# Patient Record
Sex: MALE | Race: OTHER
[De-identification: names, ages, dates, MRNs, and addresses within clinical notes are randomized per-mention and may not be internally consistent; named-entity substitution may affect disease eponyms.]

---

## 2020-08-26 ENCOUNTER — HOSPITAL ENCOUNTER (INPATIENT)
Dept: HOSPITAL 54 - ER | Age: 60
LOS: 1 days | Discharge: HOME | DRG: 313 | End: 2020-08-27
Attending: INTERNAL MEDICINE | Admitting: INTERNAL MEDICINE
Payer: MEDICAID

## 2020-08-26 VITALS — DIASTOLIC BLOOD PRESSURE: 72 MMHG | SYSTOLIC BLOOD PRESSURE: 118 MMHG

## 2020-08-26 VITALS — BODY MASS INDEX: 27.36 KG/M2 | HEIGHT: 63 IN | WEIGHT: 154.44 LBS

## 2020-08-26 DIAGNOSIS — M77.32: ICD-10-CM
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
## 2020-08-26 DIAGNOSIS — D72.829: ICD-10-CM

## 2020-08-26 DIAGNOSIS — Z87.442: ICD-10-CM

## 2020-08-26 DIAGNOSIS — S93.05XA: ICD-10-CM
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
## 2020-08-26 DIAGNOSIS — S82.832A: ICD-10-CM

## 2020-08-26 DIAGNOSIS — S82.842A: Primary | ICD-10-CM

## 2020-08-26 LAB
BASOPHILS # BLD AUTO: 0.1 /CMM (ref 0–0.2)
BASOPHILS NFR BLD AUTO: 0.5 % (ref 0–2)
BUN SERPL-MCNC: 12 MG/DL (ref 7–18)
CALCIUM SERPL-MCNC: 8.6 MG/DL (ref 8.5–10.1)
CHLORIDE SERPL-SCNC: 104 MMOL/L (ref 98–107)
CO2 SERPL-SCNC: 27 MMOL/L (ref 21–32)
CREAT SERPL-MCNC: 0.9 MG/DL (ref 0.6–1.3)
EOSINOPHIL NFR BLD AUTO: 0 % (ref 0–6)
GLUCOSE SERPL-MCNC: 115 MG/DL (ref 74–106)
HCT VFR BLD AUTO: 45 % (ref 39–51)
HGB BLD-MCNC: 15 G/DL (ref 13.5–17.5)
LYMPHOCYTES NFR BLD AUTO: 0.7 /CMM (ref 0.8–4.8)
LYMPHOCYTES NFR BLD AUTO: 4.3 % (ref 20–44)
MCHC RBC AUTO-ENTMCNC: 34 G/DL (ref 31–36)
MCV RBC AUTO: 91 FL (ref 80–96)
MONOCYTES NFR BLD AUTO: 0.7 /CMM (ref 0.1–1.3)
MONOCYTES NFR BLD AUTO: 4.4 % (ref 2–12)
NEUTROPHILS # BLD AUTO: 14.4 /CMM (ref 1.8–8.9)
NEUTROPHILS NFR BLD AUTO: 90.8 % (ref 43–81)
PLATELET # BLD AUTO: 253 /CMM (ref 150–450)
POTASSIUM SERPL-SCNC: 3.9 MMOL/L (ref 3.5–5.1)
RBC # BLD AUTO: 4.92 MIL/UL (ref 4.5–6)
SODIUM SERPL-SCNC: 141 MMOL/L (ref 136–145)
WBC NRBC COR # BLD AUTO: 15.9 K/UL (ref 4.3–11)

## 2020-08-26 PROCEDURE — C1713 ANCHOR/SCREW BN/BN,TIS/BN: HCPCS

## 2020-08-26 PROCEDURE — A6402 STERILE GAUZE <= 16 SQ IN: HCPCS

## 2020-08-26 PROCEDURE — A4217 STERILE WATER/SALINE, 500 ML: HCPCS

## 2020-08-26 PROCEDURE — G0378 HOSPITAL OBSERVATION PER HR: HCPCS

## 2020-08-26 PROCEDURE — 0QSK04Z REPOSITION LEFT FIBULA WITH INTERNAL FIXATION DEVICE, OPEN APPROACH: ICD-10-PCS | Performed by: SPECIALIST

## 2020-08-26 NOTE — NUR
MS RN NOTES



PATIENT SEEN AND EXAMINED BY SADAF DOUGLAS AND INFORMED THE PATIENT THE NEED FOR SURGERY. HAD 
PATIENT SIGN CONSENT FORMS AND WILL AWAIT FOR SURGERY.

## 2020-08-26 NOTE — NUR
PT ASLEEP, EASILY AWAKEN BY VERBAL STIMULI. PT LT LLE PAIN 5/10 & TOMA WELL, NAD 
NOTED AT THIS TIME. WILL CONT TO MONITOR.

## 2020-08-26 NOTE — NUR
MS RN: RECEIVED PATIENT 

Patient in bed, awake. Left ankle with dry dressing wrapped with ACE wrap, no bleeding. Able 
to wiggle toes, denies pain. Fall precaution maintained.

## 2020-08-26 NOTE — NUR
MS RN NOTES



PATIENT IN BED RESTING COMFORTABLY AT THIS TIME WITH NO COMPLAINTS OF PAIN OR DISCOMFORT. 
ALERT AND ORIENTED, ON ROOM AIR, WITH NO SIGNS OF RESPIRATORY DISTRESS, WITH NO SIGNS OF SOB 
NOTED, AND WITH EVEN NON-LABORED BREATHING. PATIENT SKIN KEPT CLEAN, WARM AND DRY TO TOUCH. 
IV ACCESS INTACT AND PATENT, ON LEFT AC, SALINE LOCK. PATIENT LEFT LEG ELEVATED, WITH 
SURGICAL DRESSING INTACT AND PATENT WITH NO DRAINAGE. MET ALL OF PATIENT'S NEEDS. SAFETY 
PRECAUTIONS IMPLEMENTED WITH BED LOCKED, BED IN THE LOWEST POSITION, BILATERAL SIDE RAILS 
UP, BED ALARM ON, AND CALL LIGHT WITHIN EASY REACH OF THE PATIENT. WILL ENDORSE PLAN OF CARE 
TO UPCOMING NURSE.

## 2020-08-26 NOTE — NUR
MS RN NOTES



PATIENT ARRIVED TO THE UNIT VIA GURNEY FROM THE EMERGENCY DEPARTMENT. PATIENT ALERT AND 
ORIENTED X 4. ON ROOM AIR, NO SIGNS OF RESPIRATORY DISTRESS AT THIS TIME, NO SIGNS OF SOB 
NOTED AND WITH EVEN NON-LABORED BREATHING. PATIENT SKIN WARM AND DRY TO TOUCH. IV ACCESS 
INTACT AND PATENT ON LEFT AC 18 GAUGE, SALINE LOCK. PATIENT STATING MILD PAIN RATING IT 4/10 
ON LEFT FOOT, PROVIDED COMFORT MEASURES TO PATIENT.  SAFETY PRECAUTIONS IMPLEMENTED WITH BED 
LOCKED, BED IN THE LOWEST POSITION, BED ALARM ON, AND CALL LIGHT WITHIN EASY REACH OF THE 
PATIENT. WILL CONTINUE TO MONITOR PATIENT.
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
## 2020-08-27 VITALS — SYSTOLIC BLOOD PRESSURE: 166 MMHG | DIASTOLIC BLOOD PRESSURE: 99 MMHG

## 2020-08-27 VITALS — SYSTOLIC BLOOD PRESSURE: 156 MMHG | DIASTOLIC BLOOD PRESSURE: 89 MMHG

## 2020-08-27 LAB
BASOPHILS # BLD AUTO: 0 /CMM (ref 0–0.2)
BASOPHILS NFR BLD AUTO: 0.2 % (ref 0–2)
BUN SERPL-MCNC: 12 MG/DL (ref 7–18)
CALCIUM SERPL-MCNC: 8.4 MG/DL (ref 8.5–10.1)
CHLORIDE SERPL-SCNC: 101 MMOL/L (ref 98–107)
CHOLEST SERPL-MCNC: 200 MG/DL (ref ?–200)
CO2 SERPL-SCNC: 27 MMOL/L (ref 21–32)
CREAT SERPL-MCNC: 0.7 MG/DL (ref 0.6–1.3)
EOSINOPHIL NFR BLD AUTO: 0.2 % (ref 0–6)
GLUCOSE SERPL-MCNC: 118 MG/DL (ref 74–106)
HCT VFR BLD AUTO: 42 % (ref 39–51)
HDLC SERPL-MCNC: 65 MG/DL (ref 40–60)
HGB BLD-MCNC: 13.9 G/DL (ref 13.5–17.5)
LDLC SERPL DIRECT ASSAY-MCNC: 128 MG/DL (ref 0–99)
LYMPHOCYTES NFR BLD AUTO: 0.8 /CMM (ref 0.8–4.8)
LYMPHOCYTES NFR BLD AUTO: 8.3 % (ref 20–44)
MAGNESIUM SERPL-MCNC: 2.1 MG/DL (ref 1.8–2.4)
MCHC RBC AUTO-ENTMCNC: 33 G/DL (ref 31–36)
MCV RBC AUTO: 92 FL (ref 80–96)
MONOCYTES NFR BLD AUTO: 1.1 /CMM (ref 0.1–1.3)
MONOCYTES NFR BLD AUTO: 11 % (ref 2–12)
NEUTROPHILS # BLD AUTO: 8.1 /CMM (ref 1.8–8.9)
NEUTROPHILS NFR BLD AUTO: 80.3 % (ref 43–81)
PHOSPHATE SERPL-MCNC: 2.5 MG/DL (ref 2.5–4.9)
PLATELET # BLD AUTO: 231 /CMM (ref 150–450)
POTASSIUM SERPL-SCNC: 3.8 MMOL/L (ref 3.5–5.1)
RBC # BLD AUTO: 4.56 MIL/UL (ref 4.5–6)
SODIUM SERPL-SCNC: 135 MMOL/L (ref 136–145)
TRIGL SERPL-MCNC: 86 MG/DL (ref 30–150)
WBC NRBC COR # BLD AUTO: 10.1 K/UL (ref 4.3–11)

## 2020-08-27 RX ADMIN — Medication PRN TAB: at 06:56

## 2020-08-27 RX ADMIN — Medication PRN TAB: at 17:00

## 2020-08-27 RX ADMIN — Medication PRN TAB: at 12:47

## 2020-08-27 NOTE — NUR
MS/RN - Notes

Dr. Silvestre made aware of SBP >160, no history of HTN. Per MD, no need for BP medication, it's 
from pain.

## 2020-08-27 NOTE — NUR
MS/RN - Assessment

Patient is awake, A/O x 4, reports moderate pain to left knee op site, Norco was given 
earlier this morning, POD#1 ORIF left ankle, dressing is c/d/i without drainage, discharge, 
surrounding erythema, or excess warmth. Skin on BLE is warm to touch, negative calf 
tenderness, negative for edema, sensation on both lower ext are intact, non-weight bearing 
on LLE. Saline lock on the LAC is patent and intact with no signs of infiltration. Labs 
reviewed, no critical results. Fall precautions maintained. Will continue with current plan 
of care.

## 2020-08-27 NOTE — NUR
MS/RN - Notes

Patient for discharge home today, able to ambulate using axillary crutches, needs standby 
assistance with ADLs. Per patient, his friend will pick him up around 18:00.

## 2020-08-27 NOTE — NUR
MS/RN - Discharge 

Patient is alert and oriented X 4, discharged home in stable condition, afebrile, denies 
pain, not in any form of distress. Left ankle dressing is clean, dry, and intact. Reviewed 
discharge instructions with patient and he verbalized full understanding of all teachings 
including medications and follow-up care with Dr. Masterson in 1-2 weeks. Patient was advised 
to get help right away if he have drainage, redness, swelling or pain at the incision site, 
bad smell coming from the op site or the dressing, fever, chest pain, shortness of breath, 
palpitations, abdominal pain/distention, intractable nausea and vomiting, diarrhea, 
weakness, loss of consciousness or any other emergent concerns. All personal belongings with 
patient and he deny any missing items. Patient refused photos to be taken of skin, no 
breakdown. Saline lock removed on the left AC with catheter tip intact, no redness, no 
swelling noted at the site. Discharge paperwork signed and copies were given per protocol. 
Patient left the unit at 18:30 and was transported via private car.

## 2020-08-27 NOTE — NUR
MS RN: END OF SHIFT REPORT

Patient in bed. LLE with ACE wrap, no bleeding, able to wiggle left toes, denies pain. NWB 
LLE per Ortho. No acute events overnight. Will endorse to oncoming RN.